# Patient Record
Sex: MALE | Race: WHITE | NOT HISPANIC OR LATINO | URBAN - METROPOLITAN AREA
[De-identification: names, ages, dates, MRNs, and addresses within clinical notes are randomized per-mention and may not be internally consistent; named-entity substitution may affect disease eponyms.]

---

## 2022-05-03 ENCOUNTER — TRANSFERRED RECORDS (OUTPATIENT)
Dept: HEALTH INFORMATION MANAGEMENT | Facility: CLINIC | Age: 72
End: 2022-05-03

## 2024-02-23 ENCOUNTER — TRANSFERRED RECORDS (OUTPATIENT)
Dept: HEALTH INFORMATION MANAGEMENT | Facility: CLINIC | Age: 74
End: 2024-02-23

## 2024-04-18 ENCOUNTER — TRANSFERRED RECORDS (OUTPATIENT)
Dept: HEALTH INFORMATION MANAGEMENT | Facility: CLINIC | Age: 74
End: 2024-04-18
Payer: COMMERCIAL

## 2024-05-02 ENCOUNTER — TELEPHONE (OUTPATIENT)
Dept: UROLOGY | Facility: CLINIC | Age: 74
End: 2024-05-02
Payer: COMMERCIAL

## 2024-05-02 NOTE — TELEPHONE ENCOUNTER
[facility] Call Center    Phone Message    May a detailed message be left on voicemail: yes     Reason for Call: Other: New pt is researching providers for a Holep procedure and would like information on [facility], such as how many of these procedures has he done etc. Please call (not today though/he's going to another medical appt). Thank you!     Action Taken: Message routed to:  [facility] ([facility]): Urology    Travel Screening: Not Applicable

## 2024-05-03 NOTE — TELEPHONE ENCOUNTER
Spoke with pt- he has [employer] insurance which [facility] does not accept.     Appointment was cancelled. Pt has no additional questions.    EVA Rivera  Care Coordinator- Urology   [phone]

## 2024-05-03 NOTE — TELEPHONE ENCOUNTER
Action May 3, 2024 JTV 10:41 AM    Action Taken [facility] sent an urgent request to [facility]ANG for records.    MEDICAL RECORDS REQUEST   [facility]  [facility]  [address] [place] [address]  PHONE: [phone]  Fax: [phone]        FUTURE VISIT INFORMATION                                                   Arcadio Mccoy, : 1950 scheduled for future visit at [facility]    APPOINTMENT INFORMATION:  Date: 2024  Provider:  Tai Leach MD  Reason for Visit/Diagnosis: HOLEP    RECORDS REQUESTED FOR VISIT                                                     NOTES  STATUS/DETAILS   OFFICE NOTE from other specialist  in process   DISCHARGE SUMMARY from hospital  in process   DISCHARGE REPORT from the ER  in process   OPERATIVE REPORT  in process   MEDICATION LIST  in process   LABS     URINALYSIS (UA)  in process   BENIGN PROSTATIC HYPERPLASIA (BPH)     CT ABDOMEN/PELVIS (IMAGES & REPORT)  in process   MRI OF PROSTATE (IMAGES & REPORT)  in process   TRUS (REPORT & IMAGES IF AVAILABLE)  in process   PSA  in process     PRE-VISIT CHECKLIST      Joint diagnostic appointment coordinated correctly          (ensure right order & amount of time) Yes   RECORD COLLECTION COMPLETE If no, please explain pending

## 2024-05-07 ENCOUNTER — PRE VISIT (OUTPATIENT)
Dept: UROLOGY | Facility: CLINIC | Age: 74
End: 2024-05-07

## 2025-04-04 ENCOUNTER — TELEPHONE (OUTPATIENT)
Dept: FAMILY MEDICINE | Facility: OTHER | Age: 75
End: 2025-04-04

## 2025-04-04 NOTE — TELEPHONE ENCOUNTER
Called number on file to ask when his last wellness visit was since he was trying to establish care. Patient states he never made the appointment and was confused on why I was calling him.  -June Hughes